# Patient Record
Sex: MALE | URBAN - METROPOLITAN AREA
[De-identification: names, ages, dates, MRNs, and addresses within clinical notes are randomized per-mention and may not be internally consistent; named-entity substitution may affect disease eponyms.]

---

## 2018-02-02 ENCOUNTER — INPATIENT (INPATIENT)
Facility: HOSPITAL | Age: 48
LOS: 0 days | Discharge: HOME | End: 2018-02-03
Attending: INTERNAL MEDICINE | Admitting: INTERNAL MEDICINE
Payer: COMMERCIAL

## 2018-02-02 VITALS
DIASTOLIC BLOOD PRESSURE: 91 MMHG | RESPIRATION RATE: 18 BRPM | SYSTOLIC BLOOD PRESSURE: 129 MMHG | HEART RATE: 68 BPM | OXYGEN SATURATION: 100 %

## 2018-02-02 DIAGNOSIS — Z98.890 OTHER SPECIFIED POSTPROCEDURAL STATES: Chronic | ICD-10-CM

## 2018-02-02 DIAGNOSIS — I20.0 UNSTABLE ANGINA: ICD-10-CM

## 2018-02-02 LAB
ALBUMIN SERPL ELPH-MCNC: 4.5 G/DL — SIGNIFICANT CHANGE UP (ref 3.3–5)
ALP SERPL-CCNC: 79 U/L — SIGNIFICANT CHANGE UP (ref 40–120)
ALT FLD-CCNC: 31 U/L — SIGNIFICANT CHANGE UP (ref 4–41)
APPEARANCE UR: CLEAR — SIGNIFICANT CHANGE UP
APTT BLD: 38.7 SEC — HIGH (ref 27.5–37.4)
AST SERPL-CCNC: 25 U/L — SIGNIFICANT CHANGE UP (ref 4–40)
BASE EXCESS BLDV CALC-SCNC: 3.6 MMOL/L — SIGNIFICANT CHANGE UP
BASOPHILS # BLD AUTO: 0.05 K/UL — SIGNIFICANT CHANGE UP (ref 0–0.2)
BASOPHILS NFR BLD AUTO: 0.8 % — SIGNIFICANT CHANGE UP (ref 0–2)
BILIRUB SERPL-MCNC: 0.4 MG/DL — SIGNIFICANT CHANGE UP (ref 0.2–1.2)
BILIRUB UR-MCNC: NEGATIVE — SIGNIFICANT CHANGE UP
BLOOD GAS VENOUS - CREATININE: 0.91 MG/DL — SIGNIFICANT CHANGE UP (ref 0.5–1.3)
BLOOD UR QL VISUAL: NEGATIVE — SIGNIFICANT CHANGE UP
BUN SERPL-MCNC: 9 MG/DL — SIGNIFICANT CHANGE UP (ref 7–23)
CALCIUM SERPL-MCNC: 9.1 MG/DL — SIGNIFICANT CHANGE UP (ref 8.4–10.5)
CHLORIDE BLDV-SCNC: 106 MMOL/L — SIGNIFICANT CHANGE UP (ref 96–108)
CHLORIDE SERPL-SCNC: 102 MMOL/L — SIGNIFICANT CHANGE UP (ref 98–107)
CK SERPL-CCNC: 107 U/L — SIGNIFICANT CHANGE UP (ref 30–200)
CO2 SERPL-SCNC: 28 MMOL/L — SIGNIFICANT CHANGE UP (ref 22–31)
COLOR SPEC: SIGNIFICANT CHANGE UP
CREAT SERPL-MCNC: 1.03 MG/DL — SIGNIFICANT CHANGE UP (ref 0.5–1.3)
EOSINOPHIL # BLD AUTO: 0.18 K/UL — SIGNIFICANT CHANGE UP (ref 0–0.5)
EOSINOPHIL NFR BLD AUTO: 2.9 % — SIGNIFICANT CHANGE UP (ref 0–6)
GAS PNL BLDV: 139 MMOL/L — SIGNIFICANT CHANGE UP (ref 136–146)
GLUCOSE BLDV-MCNC: 85 — SIGNIFICANT CHANGE UP (ref 70–99)
GLUCOSE SERPL-MCNC: 84 MG/DL — SIGNIFICANT CHANGE UP (ref 70–99)
GLUCOSE UR-MCNC: NEGATIVE — SIGNIFICANT CHANGE UP
HCO3 BLDV-SCNC: 24 MMOL/L — SIGNIFICANT CHANGE UP (ref 20–27)
HCT VFR BLD CALC: 48.1 % — SIGNIFICANT CHANGE UP (ref 39–50)
HCT VFR BLDV CALC: 48.9 % — SIGNIFICANT CHANGE UP (ref 39–51)
HGB BLD-MCNC: 15.4 G/DL — SIGNIFICANT CHANGE UP (ref 13–17)
HGB BLDV-MCNC: 16 G/DL — SIGNIFICANT CHANGE UP (ref 13–17)
IMM GRANULOCYTES # BLD AUTO: 0.01 # — SIGNIFICANT CHANGE UP
IMM GRANULOCYTES NFR BLD AUTO: 0.2 % — SIGNIFICANT CHANGE UP (ref 0–1.5)
INR BLD: 0.97 — SIGNIFICANT CHANGE UP (ref 0.88–1.17)
KETONES UR-MCNC: NEGATIVE — SIGNIFICANT CHANGE UP
LACTATE BLDV-MCNC: 0.9 MMOL/L — SIGNIFICANT CHANGE UP (ref 0.5–2)
LEUKOCYTE ESTERASE UR-ACNC: NEGATIVE — SIGNIFICANT CHANGE UP
LYMPHOCYTES # BLD AUTO: 2 K/UL — SIGNIFICANT CHANGE UP (ref 1–3.3)
LYMPHOCYTES # BLD AUTO: 32.2 % — SIGNIFICANT CHANGE UP (ref 13–44)
MCHC RBC-ENTMCNC: 25.8 PG — LOW (ref 27–34)
MCHC RBC-ENTMCNC: 32 % — SIGNIFICANT CHANGE UP (ref 32–36)
MCV RBC AUTO: 80.6 FL — SIGNIFICANT CHANGE UP (ref 80–100)
MONOCYTES # BLD AUTO: 0.4 K/UL — SIGNIFICANT CHANGE UP (ref 0–0.9)
MONOCYTES NFR BLD AUTO: 6.4 % — SIGNIFICANT CHANGE UP (ref 2–14)
MUCOUS THREADS # UR AUTO: SIGNIFICANT CHANGE UP
NEUTROPHILS # BLD AUTO: 3.58 K/UL — SIGNIFICANT CHANGE UP (ref 1.8–7.4)
NEUTROPHILS NFR BLD AUTO: 57.5 % — SIGNIFICANT CHANGE UP (ref 43–77)
NITRITE UR-MCNC: NEGATIVE — SIGNIFICANT CHANGE UP
NRBC # FLD: 0 — SIGNIFICANT CHANGE UP
PCO2 BLDV: 62 MMHG — HIGH (ref 41–51)
PH BLDV: 7.3 PH — LOW (ref 7.32–7.43)
PH UR: 6 — SIGNIFICANT CHANGE UP (ref 4.6–8)
PLATELET # BLD AUTO: 241 K/UL — SIGNIFICANT CHANGE UP (ref 150–400)
PMV BLD: 10 FL — SIGNIFICANT CHANGE UP (ref 7–13)
PO2 BLDV: 25 MMHG — LOW (ref 35–40)
POTASSIUM BLDV-SCNC: 4 MMOL/L — SIGNIFICANT CHANGE UP (ref 3.4–4.5)
POTASSIUM SERPL-MCNC: 4.2 MMOL/L — SIGNIFICANT CHANGE UP (ref 3.5–5.3)
POTASSIUM SERPL-SCNC: 4.2 MMOL/L — SIGNIFICANT CHANGE UP (ref 3.5–5.3)
PROT SERPL-MCNC: 7.6 G/DL — SIGNIFICANT CHANGE UP (ref 6–8.3)
PROT UR-MCNC: NEGATIVE MG/DL — SIGNIFICANT CHANGE UP
PROTHROM AB SERPL-ACNC: 10.8 SEC — SIGNIFICANT CHANGE UP (ref 9.8–13.1)
RBC # BLD: 5.97 M/UL — HIGH (ref 4.2–5.8)
RBC # FLD: 13.2 % — SIGNIFICANT CHANGE UP (ref 10.3–14.5)
RBC CASTS # UR COMP ASSIST: SIGNIFICANT CHANGE UP (ref 0–?)
SAO2 % BLDV: 36.7 % — LOW (ref 60–85)
SODIUM SERPL-SCNC: 142 MMOL/L — SIGNIFICANT CHANGE UP (ref 135–145)
SP GR SPEC: 1.01 — SIGNIFICANT CHANGE UP (ref 1–1.04)
TROPONIN T SERPL-MCNC: < 0.06 NG/ML — SIGNIFICANT CHANGE UP (ref 0–0.06)
TSH SERPL-MCNC: 2.25 UIU/ML — SIGNIFICANT CHANGE UP (ref 0.27–4.2)
UROBILINOGEN FLD QL: NORMAL MG/DL — SIGNIFICANT CHANGE UP
WBC # BLD: 6.22 K/UL — SIGNIFICANT CHANGE UP (ref 3.8–10.5)
WBC # FLD AUTO: 6.22 K/UL — SIGNIFICANT CHANGE UP (ref 3.8–10.5)

## 2018-02-02 PROCEDURE — 93010 ELECTROCARDIOGRAM REPORT: CPT

## 2018-02-02 RX ORDER — ASPIRIN/CALCIUM CARB/MAGNESIUM 324 MG
81 TABLET ORAL DAILY
Qty: 0 | Refills: 0 | Status: DISCONTINUED | OUTPATIENT
Start: 2018-02-03 | End: 2018-02-03

## 2018-02-02 RX ORDER — METOPROLOL TARTRATE 50 MG
12.5 TABLET ORAL
Qty: 0 | Refills: 0 | Status: DISCONTINUED | OUTPATIENT
Start: 2018-02-02 | End: 2018-02-03

## 2018-02-02 RX ORDER — SODIUM CHLORIDE 9 MG/ML
3 INJECTION INTRAMUSCULAR; INTRAVENOUS; SUBCUTANEOUS EVERY 8 HOURS
Qty: 0 | Refills: 0 | Status: DISCONTINUED | OUTPATIENT
Start: 2018-02-02 | End: 2018-02-03

## 2018-02-02 RX ORDER — TICAGRELOR 90 MG/1
90 TABLET ORAL
Qty: 0 | Refills: 0 | Status: DISCONTINUED | OUTPATIENT
Start: 2018-02-03 | End: 2018-02-03

## 2018-02-02 RX ORDER — ATORVASTATIN CALCIUM 80 MG/1
80 TABLET, FILM COATED ORAL AT BEDTIME
Qty: 0 | Refills: 0 | Status: DISCONTINUED | OUTPATIENT
Start: 2018-02-02 | End: 2018-02-03

## 2018-02-02 RX ADMIN — ATORVASTATIN CALCIUM 80 MILLIGRAM(S): 80 TABLET, FILM COATED ORAL at 21:12

## 2018-02-02 RX ADMIN — SODIUM CHLORIDE 3 MILLILITER(S): 9 INJECTION INTRAMUSCULAR; INTRAVENOUS; SUBCUTANEOUS at 21:11

## 2018-02-02 NOTE — H&P CARDIOLOGY - HISTORY OF PRESENT ILLNESS
47 year old male former smoker with family history of CAD with pre-diabetes who presented to his Cardiologist office complaining of exertional chest pressure with associated difficulty breathing for the past 2 weeks when walking to work in [place] which is about [place] from [facility], cannot specify the the number of blocks when at time of onset. Symptoms exacerbated by the extreme cold weather.  Patient admits to noting an increaed intensity over the past few days, relieved with rest. Admits to a decrease in exercise tolerance and an increase in fatigue. Denies dizziness, syncope, edema, orthopnea, PND. Underwent a Nuclear stress with reported abnormal results. Patient presented to [facility] ED, first troponin negative and recommended cath.   In light of patients cardiac risk factors, symptoms and abnormal noninvasive test findings there is high suspicion for CAD. Patient is now referred to [facility] for a cardiac catheterization with possible PTCA/stent.

## 2018-02-02 NOTE — ED PROVIDER NOTE - OBJECTIVE STATEMENT
47 yr old male with hx of pre DM, father had CAD, first MI at approx pt age, CABG and then passed from heart disease at 55.  For last 1-2 weeks has had exertional CP when outside.  States only with exertion.  Friend of / so called and went in for nuclear stress.  States while undergoing nuclear stress told he "had an artery blocked".  Told to come to ED for angio.  Here no complaints at rest.

## 2018-02-02 NOTE — ED ADULT NURSE NOTE - CHIEF COMPLAINT QUOTE
Co intermittent non radiating left sided chest pressure x 2 weeks, worsening and more frequent last week. Had a nuclear stress test at cardiologist today and told it was abnormal. Sent for an angiogram.

## 2018-02-02 NOTE — CONSULT NOTE ADULT - ATTENDING COMMENTS
EP ATTENDING    Agree with above. Given preserved LVEF palpitations are less concerning. Management of severe LAD disease as per cardiology. If inpatient telemetry unremarkable will recommend outpatient event monitoring.

## 2018-02-02 NOTE — CHART NOTE - NSCHARTNOTEFT_GEN_A_CORE
R Radial band removed. No hematoma noted.  pulses distal 2+  capillary refill <3sec  no complaints of pain, numbness of tingling in RUE  No medications given   VSS  Patient educated on the importance of alerting staff for any symptoms

## 2018-02-02 NOTE — CONSULT NOTE ADULT - SUBJECTIVE AND OBJECTIVE BOX
HISTORY OF PRESENT ILLNESS: This is a 47 year old male well known to our office with HLD , pre-DM, with a strong FH history of CAD (father passed at age 55 from CAD and had first MI at age 46)  He has historically normal LV function on recent TTE. He has been experiencing chest pain over the last several days that has been intermittent in nature. Stress test in our office revealed  Moderate did to distal anterior, apical, and distal inferior wall ischemia with preserved EF. He is admitted for r/o ACS. Currently he is CP free.     PAST MEDICAL & SURGICAL HISTORY:  Hyperlipidemia  Pre-diabetes  History of appendectomy      MEDICATIONS:  AT HOME:   Lipitor 10 mg qday      Allergies  No Known Drug Allergies  Seafood (Rash; Other)    FAMILY HISTORY:  Family history of heart attack (Father): father with MI 41yo and CABG,  57yo    SOCIAL HISTORY:    [+ ] Non-smoker  [ ] Smoker  [ +] Alcohol social      REVIEW OF SYSTEMS:    chest pain   otherwise negative     PHYSICAL EXAM:  T(C): --  HR: 68 (18 @ 14:26) (68 - 68)  BP: 129/91 (18 @ 14:26) (129/91 - 129/91)  RR: 18 (18 @ 14:26) (18 - 18)  SpO2: 100% (18 @ 14:26) (100% - 100%)  Wt(kg): --  I&O's Summary      S1S2 RRR  CTABL  SOFT NT ND  WARM DRY NO EDEMA  ALERT    TELEMETRY:  nsr   	    ECG:  	nsr no acute ischemia    RADIOLOGY: pending    OTHER: 	  	  LABS:	 	    CARDIAC MARKERS:  Troponin T, Serum (18 @ 15:21)    Troponin T, Serum: < 0.06:                          15.4   6.22  )-----------( 241      ( 2018 15:21 )             48.1           142  |  102  |  9   ----------------------------<  84  4.2   |  28  |  1.03    Ca    9.1      2018 15:21    TPro  7.6  /  Alb  4.5  /  TBili  0.4  /  DBili  x   /  AST  25  /  ALT  31  /  AlkPhos  79      ASSESSMENT/PLAN:  This is a 47 year old male well known to our office with HLD , with a strong FH history of CAD (father passed at age 55 from CAD and had first MI at age 46)  He has historically normal LV function on recent TTE. He has been experiencing chest pain over the last several days that has been intermittent in nature. Stress test in our office revealed  Moderate did to distal anterior, apical, and distal inferior wall ischemia with preserved EF. He is admitted for r/o ACS. Currently he is CP free.     -- given commorbidities, symptoms, and abnormal stress test , proceed with cardiac cath to r/o CAD  -- final recs pending above  -- c/w statin therapy  -- hd stable no CHF  -- f/u with Dr Swann upon dc for cardiology  @ 11am    Kristal Pacheco [facility]  O:  [phone]  P: [phone]

## 2018-02-02 NOTE — CONSULT NOTE ADULT - SUBJECTIVE AND OBJECTIVE BOX
HISTORY OF PRESENT ILLNESS:  This is a 47 year old male well known to our office with HLD , pre-DM, with a strong FH history of CAD (father passed at age 55 from CAD and had first MI at age 46)  He has historically normal LV function on recent TTE. He has been experiencing chest pain w/ intermittent palpations  over the last several days. Stress test in our office revealed  Moderate did to distal anterior, apical, and distal inferior wall ischemia with preserved EF. He is admitted for r/o ACS. Currently he is CP free.     PAST MEDICAL & SURGICAL HISTORY:  Hyperlipidemia  Pre-diabetes  History of appendectomy      PREVIOUS DIAGNOSTIC TESTING:    [ ] Echocardiogram:  [ ]  Catheterization:  [ ] Stress Test:  	in office     abnormal     MEDICATIONS:  metoprolol     tartrate 12.5 milliGRAM(s) Oral two times a day  atorvastatin 80 milliGRAM(s) Oral at bedtime        Allergies    No Known Drug Allergies  Seafood (Rash; Other)    Intolerances        FAMILY HISTORY:  Family history of heart attack (Father): father with MI 39yo and CABG,  57yo      SOCIAL HISTORY:    [x ] Non-smoker  [ ] Former Smoker  [ ] Current Smoker  [x ] Alcohol social       REVIEW OF SYSTEMS:  [x ]chest pain  [ x ]shortness of breath  [x  ]palpitations  [  ]syncope  [ ]near syncope [  ]diplopia  [  ]altered mental status   [  ]fevers  [ ]chills [ ]nausea  [ ] vomiting  [ ]abdominal pain  [ ]melena  [ ]BRBPR  [  ]epistaxis  [  ]rash  [  ]lower extremity edema          [ ] All others negative	  [ ] Unable to obtain    PHYSICAL EXAM:  T(C): --  HR: 68 (18 @ 14:26) (68 - 68)  BP: 129/91 (18 @ 14:26) (129/91 - 129/91)  RR: 18 (18 @ 14:26) (18 - 18)  SpO2: 100% (18 @ 14:26) (100% - 100%)  Wt(kg): --  I&O's Summary      Appearance: No Signs of acute distress  HEENT:   Normal oral mucosa, PERRL, EOMI	  Lymphatic: No lymphadenopathy  Cardiovascular: Normal S1 S2,RRR No JVD, No murmurs,  Respiratory: Lungs clear to auscultation	  Gastrointestinal:  Soft, Non-tender, + BS		  Extremities:  No clubbing, cyanosis or edema  Vascular: Peripheral pulses palpable 2+ bilaterally    TELEMETRY: 	 SR    ECG:  nsr no acute ischemia	  RADIOLOGY:    OTHER: 	  	  LABS:	 	    CARDIAC MARKERS:                          15.4   6.22  )-----------( 241      ( 2018 15:21 )             48.1     02-    142  |  102  |  9   ----------------------------<  84  4.2   |  28  |  1.03    Ca    9.1      2018 15:21    TPro  7.6  /  Alb  4.5  /  TBili  0.4  /  DBili  x   /  AST  25  /  ALT  31  /  AlkPhos  79      proBNP:   Lipid Profile:   HgA1c:   TSH:     ASSESSMENT/PLAN: This is a 47 year old male well known to our office with HLD , pre-DM, with a strong FH history of CAD (father passed at age 55 from CAD and had first MI at age 46)  He has historically normal LV function on recent TTE. He has been experiencing chest pain w/ intermittent palpations over the last several days. Stress test in our office revealed  Moderate did to distal anterior, apical, and distal inferior wall ischemia with preserved EF. He is admitted for r/o ACS.     CE neg x 1  CATH w/ stent placed to LAD 99% LAD lesion   check TSH in AM   monitor on tele

## 2018-02-02 NOTE — ED ADULT TRIAGE NOTE - CHIEF COMPLAINT QUOTE
Co intermittent non radiating left sided chest pressure x 2 weeks, worsening and more frequent last week. Had a nuclear stress test at cardiologist today and told it was abnormal. Co intermittent non radiating left sided chest pressure x 2 weeks, worsening and more frequent last week. Had a nuclear stress test at cardiologist today and told it was abnormal. Sent for an angiogram.

## 2018-02-02 NOTE — H&P CARDIOLOGY - ATTENDING COMMENTS
Patient seen and examined.  Agree with above.   -Admitted with new onset unstable angina, found to have 99% stenosis in LAD  -s/p BETY to LAD  -continue with asa and brilinta  -dc home tomorrow if medically stable      Lucia Harrison MD  [facility]  [address], [address]  [place] [address]  office: [phone]  pager: [phone]

## 2018-02-02 NOTE — ED ADULT NURSE NOTE - OBJECTIVE STATEMENT
PT c/o chest pressure upon excertion x last few weeks--pt went to PMD and had a stress test done which was abnormal--pt sent for cardiac catherization--  Pt placed on cardiac monitor in NSR--pt denies pain at present. Pt had #20 angio placed rt antecubital--labs drawn and sent--pt in NSR on the monitor with no untoward effects

## 2018-02-03 ENCOUNTER — TRANSCRIPTION ENCOUNTER (OUTPATIENT)
Age: 48
End: 2018-02-03

## 2018-02-03 VITALS
WEIGHT: 174.17 LBS | RESPIRATION RATE: 16 BRPM | OXYGEN SATURATION: 99 % | DIASTOLIC BLOOD PRESSURE: 75 MMHG | HEART RATE: 65 BPM | SYSTOLIC BLOOD PRESSURE: 121 MMHG | TEMPERATURE: 98 F

## 2018-02-03 LAB
BUN SERPL-MCNC: 15 MG/DL — SIGNIFICANT CHANGE UP (ref 7–23)
CALCIUM SERPL-MCNC: 9.2 MG/DL — SIGNIFICANT CHANGE UP (ref 8.4–10.5)
CHLORIDE SERPL-SCNC: 101 MMOL/L — SIGNIFICANT CHANGE UP (ref 98–107)
CO2 SERPL-SCNC: 26 MMOL/L — SIGNIFICANT CHANGE UP (ref 22–31)
CREAT SERPL-MCNC: 1.06 MG/DL — SIGNIFICANT CHANGE UP (ref 0.5–1.3)
GLUCOSE SERPL-MCNC: 88 MG/DL — SIGNIFICANT CHANGE UP (ref 70–99)
HCT VFR BLD CALC: 45 % — SIGNIFICANT CHANGE UP (ref 39–50)
HGB BLD-MCNC: 14.6 G/DL — SIGNIFICANT CHANGE UP (ref 13–17)
MCHC RBC-ENTMCNC: 25.9 PG — LOW (ref 27–34)
MCHC RBC-ENTMCNC: 32.4 % — SIGNIFICANT CHANGE UP (ref 32–36)
MCV RBC AUTO: 79.9 FL — LOW (ref 80–100)
NRBC # FLD: 0 — SIGNIFICANT CHANGE UP
PLATELET # BLD AUTO: 221 K/UL — SIGNIFICANT CHANGE UP (ref 150–400)
PMV BLD: 10.3 FL — SIGNIFICANT CHANGE UP (ref 7–13)
POTASSIUM SERPL-MCNC: 4.1 MMOL/L — SIGNIFICANT CHANGE UP (ref 3.5–5.3)
POTASSIUM SERPL-SCNC: 4.1 MMOL/L — SIGNIFICANT CHANGE UP (ref 3.5–5.3)
RBC # BLD: 5.63 M/UL — SIGNIFICANT CHANGE UP (ref 4.2–5.8)
RBC # FLD: 13.2 % — SIGNIFICANT CHANGE UP (ref 10.3–14.5)
SODIUM SERPL-SCNC: 141 MMOL/L — SIGNIFICANT CHANGE UP (ref 135–145)
WBC # BLD: 7.78 K/UL — SIGNIFICANT CHANGE UP (ref 3.8–10.5)
WBC # FLD AUTO: 7.78 K/UL — SIGNIFICANT CHANGE UP (ref 3.8–10.5)

## 2018-02-03 PROCEDURE — 93010 ELECTROCARDIOGRAM REPORT: CPT

## 2018-02-03 RX ORDER — ATORVASTATIN CALCIUM 80 MG/1
1 TABLET, FILM COATED ORAL
Qty: 30 | Refills: 0 | OUTPATIENT
Start: 2018-02-03 | End: 2018-03-04

## 2018-02-03 RX ORDER — ATORVASTATIN CALCIUM 80 MG/1
1 TABLET, FILM COATED ORAL
Qty: 0 | Refills: 0 | COMMUNITY

## 2018-02-03 RX ORDER — METOPROLOL TARTRATE 50 MG
0.5 TABLET ORAL
Qty: 30 | Refills: 0 | OUTPATIENT
Start: 2018-02-03 | End: 2018-03-04

## 2018-02-03 RX ORDER — ASPIRIN/CALCIUM CARB/MAGNESIUM 324 MG
1 TABLET ORAL
Qty: 0 | Refills: 0 | COMMUNITY
Start: 2018-02-03

## 2018-02-03 RX ORDER — MELOXICAM 15 MG/1
1 TABLET ORAL
Qty: 0 | Refills: 0 | COMMUNITY

## 2018-02-03 RX ORDER — TICAGRELOR 90 MG/1
1 TABLET ORAL
Qty: 60 | Refills: 0 | OUTPATIENT
Start: 2018-02-03 | End: 2018-03-04

## 2018-02-03 RX ADMIN — Medication 12.5 MILLIGRAM(S): at 05:11

## 2018-02-03 RX ADMIN — SODIUM CHLORIDE 3 MILLILITER(S): 9 INJECTION INTRAMUSCULAR; INTRAVENOUS; SUBCUTANEOUS at 05:11

## 2018-02-03 RX ADMIN — TICAGRELOR 90 MILLIGRAM(S): 90 TABLET ORAL at 05:11

## 2018-02-03 RX ADMIN — Medication 30 MILLILITER(S): at 10:22

## 2018-02-03 NOTE — DISCHARGE NOTE ADULT - MEDICATION SUMMARY - MEDICATIONS TO TAKE
I will START or STAY ON the medications listed below when I get home from the hospital:    aspirin 81 mg oral delayed release tablet  -- 1 tab(s) by mouth once a day  -- Indication: For CAD    atorvastatin 80 mg oral tablet  -- 1 tab(s) by mouth once a day (at bedtime)  -- Indication: For Hyperlipidemia    ticagrelor 90 mg oral tablet  -- 1 tab(s) by mouth 2 times a day  -- Indication: For stented coronary artery    metoprolol tartrate 25 mg oral tablet  -- 0.5 tab(s) by mouth 2 times a day   -- It is very important that you take or use this exactly as directed.  Do not skip doses or discontinue unless directed by your doctor.  May cause drowsiness.  Alcohol may intensify this effect.  Use care when operating dangerous machinery.  Some non-prescription drugs may aggravate your condition.  Read all labels carefully.  If a warning appears, check with your doctor before taking.  Take with food or milk.  This drug may impair the ability to drive or operate machinery.  Use care until you become familiar with its effects.    -- Indication: For CAD

## 2018-02-03 NOTE — DISCHARGE NOTE ADULT - MEDICATION SUMMARY - MEDICATIONS TO STOP TAKING
I will STOP taking the medications listed below when I get home from the hospital:    meloxicam  -- 1 tab(s) by mouth once a day (at bedtime)

## 2018-02-03 NOTE — PROGRESS NOTE ADULT - SUBJECTIVE AND OBJECTIVE BOX
Subjective:   	 no chest pain   no shortness of breath       MEDICATIONS:  MEDICATIONS  (STANDING):  aspirin enteric coated 81 milliGRAM(s) Oral daily  atorvastatin 80 milliGRAM(s) Oral at bedtime  metoprolol     tartrate 12.5 milliGRAM(s) Oral two times a day  sodium chloride 0.9% lock flush 3 milliLiter(s) IV Push every 8 hours  ticagrelor 90 milliGRAM(s) Oral two times a day    MEDICATIONS  (PRN):  aluminum hydroxide/magnesium hydroxide/simethicone Suspension 30 milliLiter(s) Oral every 6 hours PRN Dyspepsia      LABS:	 	    CARDIAC MARKERS:  CARDIAC MARKERS ( 02 Feb 2018 15:21 )  x     / < 0.06 ng/mL / 107 u/L / x     / x                                    14.6   7.78  )-----------( 221      ( 03 Feb 2018 06:34 )             45.0     02-03    141  |  101  |  15  ----------------------------<  88  4.1   |  26  |  1.06    Ca    9.2      03 Feb 2018 06:34    TPro  7.6  /  Alb  4.5  /  TBili  0.4  /  DBili  x   /  AST  25  /  ALT  31  /  AlkPhos  79  02-02    COAGS:       proBNP:   Lipid Profile:   HgA1c:   TSH: Thyroid Stimulating Hormone, Serum: 2.25 uIU/mL (02-02 @ 19:55)        PHYSICAL EXAM:  T(C): 36.6 (02-03-18 @ 05:45), Max: 36.9 (02-02-18 @ 20:26)  HR: 65 (02-03-18 @ 05:45) (65 - 73)  BP: 121/75 (02-03-18 @ 05:45) (121/75 - 126/65)  RR: 16 (02-03-18 @ 05:45) (16 - 16)  SpO2: 99% (02-03-18 @ 05:45) (99% - 100%)  Wt(kg): --  I&O's Summary    02 Feb 2018 07:01  -  03 Feb 2018 07:00  --------------------------------------------------------  IN: 400 mL / OUT: 0 mL / NET: 400 mL      Height (cm): 175.26 (02-02 @ 20:26)  Weight (kg): 79.4 (02-02 @ 20:26)  BMI (kg/m2): 25.8 (02-02 @ 20:26)  BSA (m2): 1.95 (02-02 @ 20:26)    	    Cardiovascular: Normal S1 S2, RRR  No JVD, 1/6 SOUMYA murmur,  Respiratory: Lungs clear to auscultation, normal effort 	  Gastrointestinal:  Soft, Non-tender, + BS	  Extremities no edema, cyanosis, clubbing B/L LE's   R Radial site soft non tender no sign active bleed or hematoma cap refill WNL's    Peripheral pulses palpable 2+ bilaterally    TELEMETRY: SR 	    ECG:  SR 	  RADIOLOGY:     DIAGNOSTIC TESTING:  [ ] Echocardiogram:  [ ]  Catheterization: official report pending  [ ] Stress Test:    OTHER: 	      ASSESSMENT/PLAN: 	47y Male with HLD , pre-DM, with a strong FH history of CAD (father passed at age 55 from CAD and had first MI at age 46)  He has historically normal LV function on recent TTE. He has been experiencing chest pain w/ intermittent palpations over the last several days. Stress test in our office revealed  Moderate did to distal anterior, apical, and distal inferior wall ischemia with preserved EF.    CATH w/ stent placed to LAD 99% LAD lesion   c/w ASA & ticagrelor for recent stent    C/W   BB & statin    F/U Dr Joaquin 2/6/18

## 2018-02-03 NOTE — DISCHARGE NOTE ADULT - OTHER SIGNIFICANT FINDINGS
2/2 CATH: successful promus premier fabian  nt LAD 95, mRCA 40; RRA access  2/3 RRA site is stable without any bleed or hematoma.  RRA pulse is +2.  R hand is warm with good capillary refill.

## 2018-02-03 NOTE — DISCHARGE NOTE ADULT - CARE PROVIDER_API CALL
Gokul Swann), Cardiology  [address]  [place] [address]  Phone: [phone]  Fax: [phone]

## 2018-02-03 NOTE — DISCHARGE NOTE ADULT - PLAN OF CARE
s/p cardiac cath with stent/maximize medical management Follow up with Dr. Swann on 2/9 at 11am  continue current meds  low fat, low salt ,low chol diet low chol diet  continue current meds low carbohydrate diet  close monitoring of blood glucose  F/U with PMD in 1 week

## 2018-02-03 NOTE — PROGRESS NOTE ADULT - SUBJECTIVE AND OBJECTIVE BOX
aluminum hydroxide/magnesium hydroxide/simethicone Suspension 30 milliLiter(s) Oral every 6 hours PRN  aspirin enteric coated 81 milliGRAM(s) Oral daily  atorvastatin 80 milliGRAM(s) Oral at bedtime  metoprolol     tartrate 12.5 milliGRAM(s) Oral two times a day  sodium chloride 0.9% lock flush 3 milliLiter(s) IV Push every 8 hours  ticagrelor 90 milliGRAM(s) Oral two times a day                            14.6   7.78  )-----------( 221      ( 03 Feb 2018 06:34 )             45.0       02-03    141  |  101  |  15  ----------------------------<  88  4.1   |  26  |  1.06    Ca    9.2      03 Feb 2018 06:34    TPro  7.6  /  Alb  4.5  /  TBili  0.4  /  DBili  x   /  AST  25  /  ALT  31  /  AlkPhos  79  02-02      CARDIAC MARKERS ( 02 Feb 2018 15:21 )  x     / < 0.06 ng/mL / 107 u/L / x     / x            T(C): 36.6 (02-03-18 @ 05:45), Max: 36.9 (02-02-18 @ 20:26)  HR: 65 (02-03-18 @ 05:45) (65 - 73)  BP: 121/75 (02-03-18 @ 05:45) (121/75 - 129/91)  RR: 16 (02-03-18 @ 05:45) (16 - 18)  SpO2: 99% (02-03-18 @ 05:45) (99% - 100%)  Wt(kg): --    I&O's Summary    02 Feb 2018 07:01  -  03 Feb 2018 07:00  --------------------------------------------------------  IN: 400 mL / OUT: 0 mL / NET: 400 mL EP ATTENDING    tele: unremarkable    no palpitations, no syncope, no angina    aluminum hydroxide/magnesium hydroxide/simethicone Suspension 30 milliLiter(s) Oral every 6 hours PRN  aspirin enteric coated 81 milliGRAM(s) Oral daily  atorvastatin 80 milliGRAM(s) Oral at bedtime  metoprolol     tartrate 12.5 milliGRAM(s) Oral two times a day  sodium chloride 0.9% lock flush 3 milliLiter(s) IV Push every 8 hours  ticagrelor 90 milliGRAM(s) Oral two times a day                            14.6   7.78  )-----------( 221      ( 03 Feb 2018 06:34 )             45.0       02-03    141  |  101  |  15  ----------------------------<  88  4.1   |  26  |  1.06    Ca    9.2      03 Feb 2018 06:34    TPro  7.6  /  Alb  4.5  /  TBili  0.4  /  DBili  x   /  AST  25  /  ALT  31  /  AlkPhos  79  02-02      CARDIAC MARKERS ( 02 Feb 2018 15:21 )  x     / < 0.06 ng/mL / 107 u/L / x     / x            T(C): 36.6 (02-03-18 @ 05:45), Max: 36.9 (02-02-18 @ 20:26)  HR: 65 (02-03-18 @ 05:45) (65 - 73)  BP: 121/75 (02-03-18 @ 05:45) (121/75 - 129/91)  RR: 16 (02-03-18 @ 05:45) (16 - 18)  SpO2: 99% (02-03-18 @ 05:45) (99% - 100%)  Wt(kg): --    no JVD  RRR, no murmurs  CTAB  soft nt/nd  no c/c/e    cath: PCI of LAD    A/P) This is a 47 year old male well known to our office with HLD , pre-DM, with a strong FH history of CAD (father passed at age 55 from CAD and had first MI at age 46)  He has historically normal LV function on recent TTE. He has been experiencing chest pain w/ intermittent palpations over the last several days. Stress test in our office revealed  Moderate did to distal anterior, apical, and distal inferior wall ischemia with preserved EF. He is admitted for r/o ACS. Cath showed 99% LAD lesion successfully stented    -continue current medical therapy including DAPT  -d/c home today  -f/u with Jemal this coming Tuesday

## 2018-02-03 NOTE — DISCHARGE NOTE ADULT - ADDITIONAL INSTRUCTIONS
patient to follow up with  in 1 week.  asked patient not to do any exertional activity   patient updated

## 2018-02-03 NOTE — DISCHARGE NOTE ADULT - MEDICATION SUMMARY - MEDICATIONS TO CHANGE
I will SWITCH the dose or number of times a day I take the medications listed below when I get home from the hospital:    Lipitor 10 mg oral tablet  -- 1 tab(s) by mouth once a day

## 2018-02-03 NOTE — CHART NOTE - NSCHARTNOTEFT_GEN_A_CORE
47yMale s/p cardiac cath for right radial site check. Pt without any complaints at this time.    Right radial site: Dressing in place c/d/i. No hematoma. No swelling/edema/discoloration/coldness of extremity. Pulses and sensation intact. Cap refill <3 secs.     Will cont to monitor.

## 2018-02-03 NOTE — DISCHARGE NOTE ADULT - CARE PLAN
Principal Discharge DX:	Unstable angina  Goal:	s/p cardiac cath with stent/maximize medical management  Assessment and plan of treatment:	Follow up with Dr. Swann on 2/9 at 11am  continue current meds  low fat, low salt ,low chol diet  Secondary Diagnosis:	Hyperlipidemia  Assessment and plan of treatment:	low chol diet  continue current meds  Secondary Diagnosis:	Pre-diabetes  Assessment and plan of treatment:	low carbohydrate diet  close monitoring of blood glucose  F/U with PMD in 1 week

## 2018-02-03 NOTE — DISCHARGE NOTE ADULT - PATIENT PORTAL LINK FT
You can access the [facility] Patient Portal, offered by [facility], by registering with the following website: [email or web address]

## 2018-02-03 NOTE — DISCHARGE NOTE ADULT - HOSPITAL COURSE
47 year old male former smoker with family history of CAD with pre-diabetes who presented to his Cardiologist office complaining of exertional chest pressure and SOB for 2weeks, Positive stress test. Pt. underwent cardiac cath as stated below.  Pt. is stable for DC 2/3/18

## 2018-08-09 PROBLEM — E78.5 HYPERLIPIDEMIA, UNSPECIFIED: Chronic | Status: ACTIVE | Noted: 2018-02-02

## 2018-08-09 PROBLEM — R73.03 PREDIABETES: Chronic | Status: ACTIVE | Noted: 2018-02-02

## 2018-08-10 ENCOUNTER — APPOINTMENT (OUTPATIENT)
Dept: ULTRASOUND IMAGING | Facility: CLINIC | Age: 48
End: 2018-08-10

## 2018-08-10 ENCOUNTER — TRANSCRIPTION ENCOUNTER (OUTPATIENT)
Age: 48
End: 2018-08-10

## 2019-03-07 NOTE — CHART NOTE - NSCHARTNOTESELECT_GEN_ALL_CORE
Patient clotted off after 2:30mins, total treatment time is 7hrs over 10hrs.   Catheter flushed with Pnss and capped.   Tele PA/Event Note

## 2021-12-24 ENCOUNTER — APPOINTMENT (OUTPATIENT)
Dept: ULTRASOUND IMAGING | Facility: CLINIC | Age: 51
End: 2021-12-24
Payer: COMMERCIAL

## 2021-12-24 ENCOUNTER — OUTPATIENT (OUTPATIENT)
Dept: OUTPATIENT SERVICES | Facility: HOSPITAL | Age: 51
LOS: 1 days | End: 2021-12-24
Payer: COMMERCIAL

## 2021-12-24 DIAGNOSIS — R10.9 UNSPECIFIED ABDOMINAL PAIN: ICD-10-CM

## 2021-12-24 DIAGNOSIS — Z98.890 OTHER SPECIFIED POSTPROCEDURAL STATES: Chronic | ICD-10-CM

## 2021-12-24 PROCEDURE — 76705 ECHO EXAM OF ABDOMEN: CPT

## 2021-12-24 PROCEDURE — 76705 ECHO EXAM OF ABDOMEN: CPT | Mod: 26

## 2022-01-06 ENCOUNTER — TRANSCRIPTION ENCOUNTER (OUTPATIENT)
Age: 52
End: 2022-01-06

## 2022-03-23 ENCOUNTER — TRANSCRIPTION ENCOUNTER (OUTPATIENT)
Age: 52
End: 2022-03-23

## 2022-08-12 NOTE — PROVIDER CONTACT NOTE (OTHER) - REASON
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 Neurology    Travel Screening: Not Applicable                                                                       pt feeling different at chest

## 2022-08-25 NOTE — ED PROVIDER NOTE - CONSTITUTIONAL APPEARANCE HYGIENE, MLM
Writer called pt and updated. Pt will go to [facility].  Pt will call their office to set those appts up   well appearing

## 2023-05-08 ENCOUNTER — OUTPATIENT (OUTPATIENT)
Dept: OUTPATIENT SERVICES | Facility: HOSPITAL | Age: 53
LOS: 1 days | End: 2023-05-08
Payer: COMMERCIAL

## 2023-05-08 ENCOUNTER — APPOINTMENT (OUTPATIENT)
Dept: RADIOLOGY | Facility: IMAGING CENTER | Age: 53
End: 2023-05-08
Payer: COMMERCIAL

## 2023-05-08 DIAGNOSIS — Z00.8 ENCOUNTER FOR OTHER GENERAL EXAMINATION: ICD-10-CM

## 2023-05-08 DIAGNOSIS — Z98.890 OTHER SPECIFIED POSTPROCEDURAL STATES: Chronic | ICD-10-CM

## 2023-05-08 PROCEDURE — 71046 X-RAY EXAM CHEST 2 VIEWS: CPT | Mod: 26

## 2023-05-08 PROCEDURE — 71046 X-RAY EXAM CHEST 2 VIEWS: CPT

## 2023-08-10 ENCOUNTER — APPOINTMENT (OUTPATIENT)
Dept: OTOLARYNGOLOGY | Facility: CLINIC | Age: 53
End: 2023-08-10
Payer: COMMERCIAL

## 2023-08-10 ENCOUNTER — NON-APPOINTMENT (OUTPATIENT)
Age: 53
End: 2023-08-10

## 2023-08-10 VITALS
SYSTOLIC BLOOD PRESSURE: 122 MMHG | WEIGHT: 175 LBS | DIASTOLIC BLOOD PRESSURE: 79 MMHG | HEIGHT: 69 IN | BODY MASS INDEX: 25.92 KG/M2 | HEART RATE: 109 BPM

## 2023-08-10 DIAGNOSIS — Z83.49 FAMILY HISTORY OF OTHER ENDOCRINE, NUTRITIONAL AND METABOLIC DISEASES: ICD-10-CM

## 2023-08-10 DIAGNOSIS — Z78.9 OTHER SPECIFIED HEALTH STATUS: ICD-10-CM

## 2023-08-10 DIAGNOSIS — I51.9 HEART DISEASE, UNSPECIFIED: ICD-10-CM

## 2023-08-10 DIAGNOSIS — J01.80 OTHER ACUTE SINUSITIS: ICD-10-CM

## 2023-08-10 DIAGNOSIS — E78.49 OTHER HYPERLIPIDEMIA: ICD-10-CM

## 2023-08-10 DIAGNOSIS — J02.9 ACUTE PHARYNGITIS, UNSPECIFIED: ICD-10-CM

## 2023-08-10 DIAGNOSIS — Z87.891 PERSONAL HISTORY OF NICOTINE DEPENDENCE: ICD-10-CM

## 2023-08-10 PROCEDURE — 31231 NASAL ENDOSCOPY DX: CPT

## 2023-08-10 PROCEDURE — 99203 OFFICE O/P NEW LOW 30 MIN: CPT | Mod: 25

## 2023-08-10 RX ORDER — ATORVASTATIN CALCIUM 80 MG/1
TABLET, FILM COATED ORAL
Refills: 0 | Status: ACTIVE | COMMUNITY

## 2023-08-10 RX ORDER — ASPIRIN 81 MG
81 TABLET, DELAYED RELEASE (ENTERIC COATED) ORAL
Refills: 0 | Status: ACTIVE | COMMUNITY

## 2023-08-10 RX ORDER — OMEPRAZOLE 20 MG/1
TABLET, ORALLY DISINTEGRATING, DELAYED RELEASE ORAL
Refills: 0 | Status: ACTIVE | COMMUNITY

## 2023-08-10 RX ORDER — EZETIMIBE 10 MG/1
TABLET ORAL
Refills: 0 | Status: ACTIVE | COMMUNITY

## 2023-08-10 NOTE — PHYSICAL EXAM
[Normal] : mucosa is normal [de-identified] : OP erythema L>R [de-identified] : erythematous rash over left upper and lower arm

## 2023-08-10 NOTE — ASSESSMENT
[FreeTextEntry1] : Assessment/Plan:  #1 Pharyngitis  #2 Possible infectious sinusitis  I have recommended the patient start Lalit med sinus rinses daily until symptoms resolved.  Discussed Medrol dose pack and recommended deferral for now.  Will reach out if symptoms worsen.

## 2023-08-10 NOTE — PROCEDURE
[FreeTextEntry6] : NASAL ENDOSCOPY: Following administration of Afrin and pontocaine, the nasal cavities were carefully inspected. They septum is somewhat serpiginous and not obstructive. The anterior nasal mucosa is mildly edematous and pale appearing bilaterally. The right side was inspected first. The inferior and middle turbinates are anatomic in their positioning. The uncinate is in place and obscures the maxillary sinus ostium. The middle meatus appears clear. The frontoethmoid recess appears clear. The sphenoethmoid recess is clear and demonstrates a patent sphenoid ostium. The nasopharynx shows no masses and there is no mucopurulence noted. The bilateral eustachian tube orifices are patent and unobstructed. The left side was inspected in similar fashion. The uncinate is in place and obscures the maxillary sinus ostium. The middle meatus could not be evaluated due to diffuse edema. The frontoethmoid recess appears clear. The mucosa appeared edematous and pale bilaterally. [de-identified] : Procedure: Transnasal flexible laryngoscopy  Description: Informed consent was obtained from the patient prior to the procedure. The patient was seated in the clinic chair. Topical anesthesia was achieved by first spraying the nasal cavities with 4% lidocaine and 1% phenylephrine.   Exam: This demonstrates a clear vallecula and crisp epiglottis. The aryepiglottic folds are intact and symmetric bilaterally. The hypopharynx does not demonstrate pooling. The interarytenoid space demonstrates no lesions. It does not demonstrate pachydermia. The false vocal folds are symmetric and without lesions or masses. False fold voicing (plica ventricularis) is not noted today. The true vocal folds show normal and symmetric motion bilaterally. There is no paradoxical motion. The right medial edge is crisp and shows no lesions or masses. The left medial edge is crisp and shows no lesions or masses. The mucosal covering is minimally edematous. There is not erythema present today. There are no obvious vascular ectasias present. The vocal processes do not demonstrate granulomas. The subglottis and proximal trachea is clear and unobstructed to the limits of the examination today.

## 2023-08-10 NOTE — HISTORY OF PRESENT ILLNESS
[de-identified] : MEGHA CHUNG  is a 52 year  year old man who presents to the [facility] with a chief complaint of throat pain since Monday. also having sinus congestion and frontal headaches. Started having rash over both his arms today.  They have had pain in their throat started 8/7 progressively getting worse- took tylenol and zinc with temporary improvement.  They deny prior CT neck. They have prior intermittent smoking history for 10 years- quit 25 years ago. They deny had prior history of alcoholism/alcohol abuse. Throat pain is when swallowing solids or hot liquids. They deny weight loss in the past 3 months. They deny altered their diet because of this pain. They deny frequent ear pain.   Pain is NOT when chewing.  They deny a prior swallow study in the past 1 year. They have seen the following types of providers for this problem: NONE. They have taken the following medications for this problem: tylenol and zinc  Doing or taking the following makes the pain better: NONE Doing the following makes the pain worse: swallowing, especially citrus

## 2023-08-16 ENCOUNTER — APPOINTMENT (OUTPATIENT)
Dept: ORTHOPEDIC SURGERY | Facility: CLINIC | Age: 53
End: 2023-08-16
Payer: COMMERCIAL

## 2023-08-16 ENCOUNTER — NON-APPOINTMENT (OUTPATIENT)
Age: 53
End: 2023-08-16

## 2023-08-16 VITALS
BODY MASS INDEX: 25.77 KG/M2 | SYSTOLIC BLOOD PRESSURE: 128 MMHG | HEIGHT: 69 IN | WEIGHT: 174 LBS | DIASTOLIC BLOOD PRESSURE: 80 MMHG | HEART RATE: 68 BPM

## 2023-08-16 DIAGNOSIS — M79.643 PAIN IN UNSPECIFIED HAND: ICD-10-CM

## 2023-08-16 DIAGNOSIS — M25.539 PAIN IN UNSPECIFIED WRIST: ICD-10-CM

## 2023-08-16 PROCEDURE — 73110 X-RAY EXAM OF WRIST: CPT | Mod: LT

## 2023-08-16 PROCEDURE — 99203 OFFICE O/P NEW LOW 30 MIN: CPT | Mod: 25

## 2023-08-16 NOTE — HISTORY OF PRESENT ILLNESS
[de-identified] : MEGHA is a 52 year old right-hand-dominant male who presents today for left wrist pain. He reports the pain began on 8/6/23 without injury or trauma.  He says that he was wearing a heavy watch and began feeling the pain, so he had to take the watch off.  The pain has not gotten better and he feels it has spread since the DOI. He reports that the pain radiates to the mid forearm and the top of the hand and the thumb. He denies any tingling in the fingers.  He also feels pain when he moves his thumb in certain ways.

## 2023-08-16 NOTE — PHYSICAL EXAM
[de-identified] : General: No apparent distress Cardiovascular: extremities warm and well-perfused, no cyanosis Pulmonary: non labored respirations  Left wrist: Tenderness to palpation about first extensor compartment tracking proximally Positive Finkelstein's No anatomic snuffbox tenderness to palpation, no tenderness to palpation about first CMC joint Fires EPL/FPL/dorsal interossei/wrist extensors Sensation intact light touch in median/ulnar/radial distributions Radial pulse 2+ [de-identified] : 3 views of the left hand and wrist obtained today and interpreted by me including AP, lateral, oblique views demonstrate no acute fracture or dislocation.

## 2023-08-16 NOTE — DISCUSSION/SUMMARY
[de-identified] : Left wrist de Quervain's tenosynovitis.  I discussed with him that this is an inflammation of the tendons that help control thumb movement.  It can cause significant pain and dysfunction with wrist and thumb use.  Discussed that it can be typically treated nonoperatively.  We discussed possible treatment options including injection, anti-inflammatories, brace use.  He would like to hold off on injection for now. I will prescribe him naproxen 500 mg twice daily to be taken with food for the next 10 days to help him with the pain.  We also gave him a thumb spica brace that he should wear during the day for the next 3 weeks, then only during physical activity after that.  If he continues to have pain after 4 to 5 weeks, he should come back and see me for repeat evaluation.  The patient expressed understanding of the diagnosis and treatment plan and all questions were answered.  This note was generated using dragon medical dictation software.  A reasonable effort has been made for proofreading its contents, but typos may still remain.  If there are any questions or points of clarification needed please notify my office.

## 2023-09-15 ENCOUNTER — APPOINTMENT (OUTPATIENT)
Dept: ORTHOPEDIC SURGERY | Facility: CLINIC | Age: 53
End: 2023-09-15
Payer: COMMERCIAL

## 2023-09-15 ENCOUNTER — APPOINTMENT (OUTPATIENT)
Dept: ORTHOPEDIC SURGERY | Facility: CLINIC | Age: 53
End: 2023-09-15

## 2023-09-15 DIAGNOSIS — M54.50 LOW BACK PAIN, UNSPECIFIED: ICD-10-CM

## 2023-09-15 DIAGNOSIS — M25.512 PAIN IN RIGHT SHOULDER: ICD-10-CM

## 2023-09-15 DIAGNOSIS — M25.511 PAIN IN RIGHT SHOULDER: ICD-10-CM

## 2023-09-15 PROCEDURE — 99213 OFFICE O/P EST LOW 20 MIN: CPT

## 2024-01-17 ENCOUNTER — APPOINTMENT (OUTPATIENT)
Dept: ORTHOPEDIC SURGERY | Facility: CLINIC | Age: 54
End: 2024-01-17
Payer: COMMERCIAL

## 2024-01-17 DIAGNOSIS — M65.4 RADIAL STYLOID TENOSYNOVITIS [DE QUERVAIN]: ICD-10-CM

## 2024-01-17 PROCEDURE — 99213 OFFICE O/P EST LOW 20 MIN: CPT

## 2024-01-17 NOTE — DISCUSSION/SUMMARY
[FreeTextEntry1] :  He has findings consistent with left wrist pain for the past 5 months secondary to de Quervain's tendinitis.  He has been treated with therapy with improvement.  He does relate this to statins that he takes for high cholesterol, and he has recently switched his medications.   I had a discussion with the patient regarding today's visit, the prognosis of this diagnosis, and treatment recommendations and options. At this time, I discussed treatment options of DeQuervain's tendinitis which include observation or a cortisone injection. He defers a cortisone injection today and opted to proceed with observation. He will follow up as needed.     The patient has agreed to the above plan of management and has expressed full understanding. All questions were fully answered to the patient's satisfaction.   My cumulative time spent on this visit included: Preparation for the visit, review of the medical records, review of pertinent diagnostic studies, examination and counseling of the patient on the above diagnosis, treatment plan and prognosis, orders of diagnostic tests, medication and/or appropriate procedures and documentation in the medical records of today's visit.

## 2024-01-17 NOTE — ADDENDUM
[FreeTextEntry1] :  I, Dillan Mistry, acted solely as a scribe for Dr. Arevalo on this date on 01/17/2024.

## 2024-01-17 NOTE — END OF VISIT
[FreeTextEntry3] : This note was written by Dillan Mistry on 01/17/2024 acting solely as a scribe for Dr. Haseeb Arevalo.   All medical record entries made by the Scribe were at my, Dr. Haseeb Arevalo, direction and personally dictated by me on 01/17/2024. I have personally reviewed the chart and agree that the record accurately reflects my personal performance of the history, physical exam, assessment and plan.

## 2024-01-17 NOTE — HISTORY OF PRESENT ILLNESS
[Right] : right hand dominant [FreeTextEntry1] :  He comes in today for evaluation of left wrist pain which started 5 months ago after lifting weights. He has swelling but denies any numbness/tingling. He rates his pain as a 3-4/10 and states that it is worse with certain movements. He states that his symptoms occurred after he was started on rosuvastatin.

## 2024-01-17 NOTE — PHYSICAL EXAM
[de-identified] : - Constitutional: This is a male in no obvious distress.  - Psych: Patient is alert and oriented to person, place and time.  Patient has a normal mood and affect. - Cardiovascular: Normal pulses throughout the upper extremities.  No significant varicosities are noted in the upper extremities. - Neuro: Strength and sensation are intact throughout the upper extremities.  Patient has normal coordination. - Respiratory:  Patient exhibits no evidence of shortness of breath or difficulty breathing. - Skin: No rashes, lesions, or other abnormalities are noted in the upper extremities. ---  Examination of his left wrist and hand demonstrates no obvious swelling.  He is tender along the distal aspect of the first dorsal compartment.  He has an equivocal Finkelstein sign.  There is no swelling or tenderness along the CMC joint of the thumb.  He is neurovascularly intact distally.

## 2024-01-30 ENCOUNTER — APPOINTMENT (OUTPATIENT)
Dept: GASTROENTEROLOGY | Facility: CLINIC | Age: 54
End: 2024-01-30
Payer: COMMERCIAL

## 2024-01-30 VITALS
SYSTOLIC BLOOD PRESSURE: 116 MMHG | RESPIRATION RATE: 16 BRPM | OXYGEN SATURATION: 96 % | WEIGHT: 173 LBS | TEMPERATURE: 97.6 F | DIASTOLIC BLOOD PRESSURE: 82 MMHG | BODY MASS INDEX: 25.55 KG/M2 | HEART RATE: 70 BPM

## 2024-01-30 DIAGNOSIS — K62.89 OTHER SPECIFIED DISEASES OF ANUS AND RECTUM: ICD-10-CM

## 2024-01-30 PROCEDURE — 99203 OFFICE O/P NEW LOW 30 MIN: CPT

## 2024-01-30 RX ORDER — NAPROXEN 500 MG/1
500 TABLET ORAL
Qty: 20 | Refills: 1 | Status: DISCONTINUED | COMMUNITY
Start: 2023-08-16 | End: 2024-01-30

## 2024-01-30 RX ORDER — METHYLPREDNISOLONE 4 MG/1
4 TABLET ORAL
Qty: 1 | Refills: 0 | Status: DISCONTINUED | COMMUNITY
Start: 2023-09-15 | End: 2024-01-30

## 2024-01-30 RX ORDER — HYDROCORTISONE ACETATE, PRAMOXINE HCL 2.5; 1 G/100G; G/100G
2.5-1 CREAM TOPICAL
Qty: 1 | Refills: 1 | Status: ACTIVE | COMMUNITY
Start: 2024-01-30 | End: 1900-01-01

## 2024-01-30 RX ORDER — HYDROCORTISONE ACETATE 25 MG/1
25 SUPPOSITORY RECTAL
Qty: 14 | Refills: 1 | Status: ACTIVE | COMMUNITY
Start: 2024-01-30 | End: 1900-01-01

## 2024-01-30 NOTE — REVIEW OF SYSTEMS
[As Noted in HPI] : as noted in HPI [Constipation] : constipation [Negative] : Heme/Lymph [FreeTextEntry7] : Rectal pain

## 2024-01-30 NOTE — HISTORY OF PRESENT ILLNESS
[FreeTextEntry1] : Valdemar Ritter is a 54-year-old  male came for consultation for evaluation of 2 months history of anal discomfort and urge to go for BMs.  He denied any diarrhea, melena.  However he reports having multiple BMs which are soft in consistency.  He had 1 episode of rectal bleeding 2 weeks ago.  He also admitted straining at BM sometimes. He had colonoscopy in 2021 by Dr. Jacinto at [facility] which was reportedly normal. He denies any weight loss, nausea or vomiting, abdominal pain.  No family history of GI cancer. He denied smoking.  He used meloxicam and ibuprofen few months ago for his arthritic pain No history of cardiac or pulmonary disease

## 2024-01-30 NOTE — PHYSICAL EXAM
[Alert] : alert [Normal Voice/Communication] : normal voice/communication [Healthy Appearing] : healthy appearing [No Acute Distress] : no acute distress [Sclera] : the sclera and conjunctiva were normal [Hearing Threshold Finger Rub Not [place]] : hearing was normal [Normal Lips/Gums] : the lips and gums were normal [Oropharynx] : the oropharynx was normal [Normal Appearance] : the appearance of the neck was normal [No Neck Mass] : no neck mass was observed [No Respiratory Distress] : no respiratory distress [No Acc Muscle Use] : no accessory muscle use [Respiration, Rhythm And Depth] : normal respiratory rhythm and effort [Auscultation Breath Sounds / Voice Sounds] : lungs were clear to auscultation bilaterally [Heart Rate And Rhythm] : heart rate was normal and rhythm regular [Normal S1, S2] : normal S1 and S2 [Murmurs] : no murmurs [None] : no edema [Bowel Sounds] : normal bowel sounds [Abdomen Tenderness] : non-tender [No Masses] : no abdominal mass palpated [Abdomen Soft] : soft [No Spinal Tenderness] : no spinal tenderness [Abnormal Walk] : normal gait [No Clubbing, Cyanosis] : no clubbing or cyanosis of the fingernails [Normal Color / Pigmentation] : normal skin color and pigmentation [] : no rash [No Focal Deficits] : no focal deficits [Oriented To Time, Place, And Person] : oriented to person, place, and time

## 2024-01-30 NOTE — ASSESSMENT
[FreeTextEntry1] : Relatively new onset of anorectal discomfort and 1 episode of BPR, likely suggestive of anal fissure or proctitis Will try Anusol HC suppositories and hydrocortisone external cream Advised Metamucil daily, high-fiber diet and plenty of oral fluids Follow-up after 2 to 3 weeks, if symptoms do not get better suggest flexible sigmoidoscopy

## 2024-02-02 RX ORDER — HYDROCORTISONE 25 MG/G
2.5 CREAM TOPICAL
Qty: 1 | Refills: 0 | Status: ACTIVE | COMMUNITY
Start: 2024-02-02 | End: 1900-01-01

## 2024-02-02 RX ORDER — HYDROCORTISONE ACETATE 25 MG/1
25 SUPPOSITORY RECTAL
Qty: 14 | Refills: 1 | Status: ACTIVE | COMMUNITY
Start: 2024-02-02 | End: 1900-01-01

## 2025-02-06 ENCOUNTER — APPOINTMENT (OUTPATIENT)
Dept: NEUROLOGY | Facility: CLINIC | Age: 55
End: 2025-02-06